# Patient Record
Sex: MALE | Employment: UNEMPLOYED | URBAN - METROPOLITAN AREA
[De-identification: names, ages, dates, MRNs, and addresses within clinical notes are randomized per-mention and may not be internally consistent; named-entity substitution may affect disease eponyms.]

---

## 2017-04-26 ENCOUNTER — OFFICE VISIT (OUTPATIENT)
Dept: FAMILY MEDICINE CLINIC | Age: 42
End: 2017-04-26

## 2017-04-26 VITALS
OXYGEN SATURATION: 98 % | HEART RATE: 74 BPM | HEIGHT: 66 IN | WEIGHT: 152 LBS | RESPIRATION RATE: 16 BRPM | SYSTOLIC BLOOD PRESSURE: 125 MMHG | DIASTOLIC BLOOD PRESSURE: 84 MMHG | BODY MASS INDEX: 24.43 KG/M2 | TEMPERATURE: 96.8 F
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
## 2017-04-26 RX ORDER — NAPROXEN 500 MG/1
TABLET ORAL
Qty: 180 TAB | Refills: 1 | Status: SHIPPED | OUTPATIENT
Start: 2017-04-26 | End: 2017-10-17

## 2017-04-26 RX ORDER — METHADONE HYDROCHLORIDE 10 MG/1
70 TABLET ORAL
COMMUNITY

## 2017-04-26 RX ORDER — NAPROXEN 500 MG/1
500 TABLET ORAL 2 TIMES DAILY WITH MEALS
Qty: 30 TAB | Refills: 1 | Status: SHIPPED | OUTPATIENT
Start: 2017-04-26 | End: 2017-04-26 | Stop reason: SDUPTHER

## 2017-04-26 NOTE — PROGRESS NOTES
Rm 1  Pt presents to the clinic complaining of right hand pain x 2 months. Pt believes it to be carpel tunnel syndrome. Depression Screening Completed: yes    Learning Assessment Completed: yes    Abuse Screening Completed: n/a    Health Maintenance reviewed and discussed per provider: yes     Coordination of Care:    1. Have you been to the ER, urgent care clinic since your last visit? Hospitalized since your last visit? no    2. Have you seen or consulted any other health care providers outside of the [address] [place] [address] since your last visit? Include any pap smears or colon screening.  no

## 2017-04-26 NOTE — PROGRESS NOTES
HISTORY OF PRESENT ILLNESS  Victor Manuel Ramos is a 38 y.o. male. HPI Comments: Pt presents with pain in the left hand and thumb that began when he started working as a . He also works part-time repairing cars. So, he has been working with his hands a lot. States he also gets numbness in the left arm/hand when he sleeps on it wrong. Denies any specific injury to the hand. He also nontes that he has [facility] really helped with his opiate addiction, but has some dehydration and constipation. Hand Pain    Associated symptoms include tingling (see HPI). Review of Systems   Respiratory: Negative for shortness of breath. Cardiovascular: Negative for chest pain. Gastrointestinal: Positive for constipation. Musculoskeletal: Positive for joint pain (right thumb) and myalgias (right hand). Neurological: Positive for tingling (see HPI). Negative for dizziness, focal weakness and seizures. Visit Vitals    /84 (BP 1 Location: Right arm, BP Patient Position: Sitting)    Pulse 74    Temp 96.8 °F (36 °C) (Oral)    Resp 16    Ht 5' 6\" (1.676 m)    Wt 152 lb (68.9 kg)    SpO2 98%    BMI 24.53 kg/m2       Physical Exam   Constitutional: He is oriented to person, place, and time. Vital signs are normal. He appears well-developed and well-nourished. He does not appear ill. No distress. HENT:   Head: Normocephalic and atraumatic. Right Ear: External ear normal.   Left Ear: External ear normal.   Nose: Nose normal. No nasal deformity. Mouth/Throat: Oropharynx is clear and moist and mucous membranes are normal.   Eyes: Conjunctivae are normal.   Neck: Neck supple. No tracheal tenderness present. Cardiovascular: Normal rate, regular rhythm and normal heart sounds. Exam reveals no gallop and no friction rub. No murmur heard. Pulses:       Radial pulses are 2+ on the right side, and 2+ on the left side.    Good capillary refill in the right fingers   Pulmonary/Chest: Effort normal and breath sounds normal. No respiratory distress. He has no decreased breath sounds. He has no wheezes. He has no rhonchi. He has no rales. Musculoskeletal:        Right elbow: Normal.He exhibits normal range of motion, no swelling and no deformity. Right hand: Normal. He exhibits normal range of motion, no tenderness, normal capillary refill, no deformity and no swelling. Normal sensation noted. Normal strength noted. He exhibits no finger abduction. Left hand: Normal.   Lymphadenopathy:     He has no cervical adenopathy. Neurological: He is alert and oriented to person, place, and time. He has normal strength. No sensory deficit. Phalen's and Tinel's are both negative.  strength is normal and symmetric   Skin: Skin is warm and dry. He is not diaphoretic. Psychiatric: He has a normal mood and affect. His speech is normal and behavior is normal. Thought content normal.   Nursing note and vitals reviewed. ASSESSMENT and PLAN  Exam is unimpressive with regard to carpal tunnel or other neuropathy. Likely overuse injury from long periods working with his hands with vibrating power tools. ICD-10-CM ICD-9-CM    1. Overuse injury T14.8 848.9 naproxen (NAPROSYN) 500 mg tablet     Attempting to treat inflammatory aspects of condition, but I strongly recommend reducing the use of the affected area as much as possible. We discussed the possibility of occupational therapy, but I'm not optimistic that it would help given the patients job duties. He indicates that the [employer] (automated) area of the shop might be available to him with some additional training, and I encourage him to pursue this. 20 minutes spent in patient counseling. Discussed the nature and likely causes of overuse injury. Also recommended icing the affected areas daily after work.      Provided additional information on inflammation, and counseled pt to avoid processed and sugary foods as a long-term strategy for protecting his liver and minimizing overall inflammation in the body. Pt verbalized understanding and agreement with the plan of care.     Vadim Arora PA-C

## 2017-04-26 NOTE — MR AVS SNAPSHOT
Visit Information Date & Time Provider Department Dept. Phone Encounter #  
 4/26/2017  7:00 AM Jorge Prince PA-C [employer] [phone] [id number] Upcoming Health Maintenance Date Due DTaP/Tdap/Td series (1 - Tdap) 2/13/1994 INFLUENZA AGE 9 TO ADULT 8/1/2016 Allergies as of 4/26/2017  Review Complete On: 4/26/2017 By: Jorge Prince PA-C No Known Allergies Current Immunizations  Never Reviewed No immunizations on file. Not reviewed this visit You Were Diagnosed With   
  
 Codes Comments Overuse injury    -  Primary ICD-10-CM: T14.8 ICD-9-CM: 017. 9 Vitals BP Pulse Temp Resp Height(growth percentile) Weight(growth percentile) 125/84 (BP 1 Location: Right arm, BP Patient Position: Sitting) 74 96.8 °F (36 °C) (Oral) 16 5' 6\" (1.676 m) 152 lb (68.9 kg) SpO2 BMI Smoking Status 98% 24.53 kg/m2 Never Smoker Vitals History BMI and BSA Data Body Mass Index Body Surface Area 24.53 kg/m 2 1.79 m 2 Preferred Pharmacy Pharmacy Name Phone [facility] [facility], [address] [place] [address] [phone] Your Updated Medication List  
  
   
This list is accurate as of: 4/26/17  8:01 AM.  Always use your most recent med list.  
  
  
  
  
 albuterol 90 mcg/actuation inhaler Commonly known as:  PROVENTIL HFA, VENTOLIN HFA, PROAIR HFA Take 2 Puffs by inhalation every six (6) hours as needed for Wheezing. ALBUTEROL IN Take  by inhalation. methadone 10 mg tablet Commonly known as:  DOLOPHINE Take 70 mg by mouth every four (4) hours as needed for Pain. naproxen 500 mg tablet Commonly known as:  NAPROSYN Take 1 Tab by mouth two (2) times daily (with meals). traZODone 50 mg tablet Commonly known as:  DESYREL  
TAKE 1 TABLET BY MOUTH EVERY NIGHT AT BEDTIME AS NEEDED FOR SLEEP  
  
  
  
  
 Prescriptions Sent to Pharmacy Refills  
 naproxen (NAPROSYN) 500 mg tablet 1 Sig: Take 1 Tab by mouth two (2) times daily (with meals). Class: Normal  
 Pharmacy: [facility] [address] [place] [address], [address] [place] [address] #: [phone] Route: Oral  
  
Patient Instructions Take the naprosyn twice daily for the next 1-2 weeks. Try to rest the right arm and alternate it with the left as much as possible. Consider icing it at home: 15-20 every 1-2 hours. Take it easy on sugars and foods with added sugar. If you need extra starch, focus on legumes, sweet potatoes, white potatoes, quinoa, etc.  
 
 
Inflammation is a natural part of the body's defenses and healing process. But excessive or chronic inflammation can lead to or worsen diseases such as heart disease, obesity, arthritis, and diabetes. Proper diet, exercise, sleep, and stress reduction are all important ways to keep inflammation in check. Here are some great online articles for more information: 
http://[employer]/grow/how-inflammation-affects-health-body 
http://Always Prepped/13-anti-inflammatory-foods/ 
http://[employer]/fitness/exercise-inflammation-060513 
 
http://Promoboxx/blog/2012/01/27/inflammation-how-to-cool-the-fire-inside-you-thats-making-you-fat-and-diseased/ 
 
Here are some things that might help your constipation:  
Dr Tony Escobar has a good blog on simple ways to treat constipation: 
http://HTP.Nextinit/blog/2017/01/13/simple-steps-dealing-constipation/ 
 
Check out this article on the relationship of fiber to constipation: 
https://Always Prepped/fiber-and-constipation-truth/ 
 
Probiotics may also help:  
https://Always Prepped/best-probiotic-supplement/ 
 
 
 
 
  
Hand Pain: Care Instructions Your Care Instructions Common causes of hand pain are overuse and injuries, such as might happen during sports or home repair projects. Everyday wear and tear, especially as you get older, also can cause hand pain. Most minor hand injuries will heal on their own, and home treatment is usually all you need to do. If you have sudden and severe pain, you may need tests and treatment. Follow-up care is a key part of your treatment and safety. Be sure to make and go to all appointments, and call your doctor if you are having problems. Its also a good idea to know your test results and keep a list of the medicines you take. How can you care for yourself at home? · Take pain medicines exactly as directed. ¨ If the doctor gave you a prescription medicine for pain, take it as prescribed. ¨ If you are not taking a prescription pain medicine, ask your doctor if you can take an over-the-counter medicine. · Rest and protect your hand. Take a break from any activity that may cause pain. · Put ice or a cold pack on your hand for 10 to 20 minutes at a time. Put a thin cloth between the ice and your skin. · Prop up the sore hand on a pillow when you ice it or anytime you sit or lie down during the next 3 days. Try to keep it above the level of your heart. This will help reduce swelling. · If your doctor recommends a sling, splint, or elastic bandage to support your hand, wear it as directed. When should you call for help? Call 911 anytime you think you may need emergency care. For example, call if: 
· Your hand turns cool or pale or changes color. Call your doctor now or seek immediate medical care if: 
· You cannot move your hand. · Your hand pops, moves out of its normal position, and then returns to its normal position. · You have signs of infection, such as: 
¨ Increased pain, swelling, warmth, or redness. ¨ Red streaks leading from the sore area. ¨ Pus draining from a place on your hand. ¨ A fever. · Your hand feels numb or tingly.  
Watch closely for changes in your health, and be sure to contact your doctor if: 
· Your hand feels unstable when you try to use it. · You do not get better as expected. · You have any new symptoms, such as swelling. · Bruises from an injury to your hand last longer than 2 weeks. Where can you learn more? Go to http://dana-jesse.info/. Enter R273 in the search box to learn more about \"Hand Pain: Care Instructions. \" Current as of: May 27, 2016 Content Version: 11.2 © 6655-6603 [employer]. Care instructions adapted under license by [employer] (which disclaims liability or warranty for this information). If you have questions about a medical condition or this instruction, always ask your healthcare professional. [address] any warranty or liability for your use of this information. Introducing [facility]! Minerva Ruiz introduces SoSocio patient portal. Now you can access parts of your medical record, email your doctor's office, and request medication refills online. 1. In your internet browser, go to [email or web address] 2. Click on the First Time User? Click Here link in the Sign In box. You will see the New Member Sign Up page. 3. Enter your SoSocio Access Code exactly as it appears below. You will not need to use this code after youve completed the sign-up process. If you do not sign up before the expiration date, you must request a new code. · SoSocio Access Code: [email or web address] Expires: 7/25/2017  8:01 AM 
 
4. Enter the last four digits of your Social Security Number (xxxx) and Date of Birth (mm/dd/yyyy) as indicated and click Submit. You will be taken to the next sign-up page. 5. Create a SoSocio ID. This will be your SoSocio login ID and cannot be changed, so think of one that is secure and easy to remember. 6. Create a SoSocio password. You can change your password at any time. 7. Enter your Password Reset Question and Answer.  This can be used at a later time if you forget your password. 8. Enter your e-mail address. You will receive e-mail notification when new information is available in [address] 9. Click Sign Up. You can now view and download portions of your medical record. 10. Click the Download Summary menu link to download a portable copy of your medical information. If you have questions, please visit the Frequently Asked Questions section of the PatientKeeper website. Remember, PatientKeeper is NOT to be used for urgent needs. For medical emergencies, dial 911. Now available from your iPhone and Android! Please provide this summary of care documentation to your next provider. Your primary care clinician is listed as Steven Acosta. If you have any questions after today's visit, please call [phone].

## 2017-04-26 NOTE — PATIENT INSTRUCTIONS
Take the naprosyn twice daily for the next 1-2 weeks. Try to rest the right arm and alternate it with the left as much as possible. Consider icing it at home: 15-20 every 1-2 hours. Take it easy on sugars and foods with added sugar. If you need extra starch, focus on legumes, sweet potatoes, white potatoes, quinoa, etc.       Inflammation is a natural part of the body's defenses and healing process. But excessive or chronic inflammation can lead to or worsen diseases such as heart disease, obesity, arthritis, and diabetes. Proper diet, exercise, sleep, and stress reduction are all important ways to keep inflammation in check. Here are some great online articles for more information:  http://[employer]/grow/how-inflammation-affects-health-body  http://Cloudwear/13-anti-inflammatory-foods/  http://[employer]orix/fitness/exercise-inflammation-060513    http://GroupSpaces/blog/2012/01/27/inflammation-how-to-cool-the-fire-inside-you-thats-making-you-fat-and-diseased/    Here are some things that might help your constipation:   Dr Torri Antunez has a good blog on simple ways to treat constipation:  http://GroupSpaces/blog/2017/01/13/simple-steps-dealing-constipation/    Check out this article on the relationship of fiber to constipation:  https://Cloudwear/fiber-and-constipation-truth/    Probiotics may also help:   https://Cloudwear/best-probiotic-supplement/             Hand Pain: Care Instructions  Your Care Instructions  Common causes of hand pain are overuse and injuries, such as might happen during sports or home repair projects. Everyday wear and tear, especially as you get older, also can cause hand pain. Most minor hand injuries will heal on their own, and home treatment is usually all you need to do. If you have sudden and severe pain, you may need tests and treatment. Follow-up care is a key part of your treatment and safety.  Be sure to make and go to all appointments, and call your doctor if you are having problems. Its also a good idea to know your test results and keep a list of the medicines you take. How can you care for yourself at home? · Take pain medicines exactly as directed. ¨ If the doctor gave you a prescription medicine for pain, take it as prescribed. ¨ If you are not taking a prescription pain medicine, ask your doctor if you can take an over-the-counter medicine. · Rest and protect your hand. Take a break from any activity that may cause pain. · Put ice or a cold pack on your hand for 10 to 20 minutes at a time. Put a thin cloth between the ice and your skin. · Prop up the sore hand on a pillow when you ice it or anytime you sit or lie down during the next 3 days. Try to keep it above the level of your heart. This will help reduce swelling. · If your doctor recommends a sling, splint, or elastic bandage to support your hand, wear it as directed. When should you call for help? Call 911 anytime you think you may need emergency care. For example, call if:  · Your hand turns cool or pale or changes color. Call your doctor now or seek immediate medical care if:  · You cannot move your hand. · Your hand pops, moves out of its normal position, and then returns to its normal position. · You have signs of infection, such as:  ¨ Increased pain, swelling, warmth, or redness. ¨ Red streaks leading from the sore area. ¨ Pus draining from a place on your hand. ¨ A fever. · Your hand feels numb or tingly. Watch closely for changes in your health, and be sure to contact your doctor if:  · Your hand feels unstable when you try to use it. · You do not get better as expected. · You have any new symptoms, such as swelling. · Bruises from an injury to your hand last longer than 2 weeks. Where can you learn more? Go to http://dana-jesse.info/.   Enter R273 in the search box to learn more about \"Hand Pain: Care Instructions. \"  Current as of: May 27, 2016  Content Version: 11.2  © 8500-2915 [employer], [facility]. Care instructions adapted under license by [employer] (which disclaims liability or warranty for this information). If you have questions about a medical condition or this instruction, always ask your healthcare professional. [place] [address] any warranty or liability for your use of this information.

## 2017-06-26 ENCOUNTER — OFFICE VISIT (OUTPATIENT)
Dept: FAMILY MEDICINE CLINIC | Age: 42
End: 2017-06-26

## 2017-06-26 VITALS
TEMPERATURE: 97.2 F | HEART RATE: 72 BPM | OXYGEN SATURATION: 95 % | SYSTOLIC BLOOD PRESSURE: 110 MMHG | BODY MASS INDEX: 26.2 KG/M2 | WEIGHT: 163 LBS | RESPIRATION RATE: 16 BRPM | HEIGHT: 66 IN | DIASTOLIC BLOOD PRESSURE: 69 MMHG

## 2017-06-26 DIAGNOSIS — L08.9 INFECTION OF SKIN: ICD-10-CM

## 2017-06-26 DIAGNOSIS — L23.3 ALLERGIC CONTACT DERMATITIS DUE TO DRUGS IN CONTACT WITH SKIN: Primary | ICD-10-CM

## 2017-06-26 RX ORDER — FLUOCINONIDE 0.5 MG/G
OINTMENT TOPICAL 2 TIMES DAILY
Qty: 45 G | Refills: 0 | Status: SHIPPED | OUTPATIENT
Start: 2017-06-26 | End: 2017-10-17

## 2017-06-26 RX ORDER — CEPHALEXIN 500 MG/1
500 CAPSULE ORAL 2 TIMES DAILY
Qty: 10 CAP | Refills: 0 | Status: SHIPPED | OUTPATIENT
Start: 2017-06-26 | End: 2017-07-01

## 2017-06-26 RX ORDER — PREDNISONE 50 MG/1
50 TABLET ORAL
Qty: 7 TAB | Refills: 0 | Status: SHIPPED | OUTPATIENT
Start: 2017-06-26 | End: 2017-10-17

## 2017-06-26 NOTE — PATIENT INSTRUCTIONS
Take the prednisone tablet with dinner tonight, then every morning for 6 more days. Take the antibiotic twice daily for 5 days. Apply the antibiotic ointment to the rash twice daily for a few days, the you should be able to stop it. Recheck as needed.

## 2017-06-26 NOTE — PROGRESS NOTES
HISTORY OF PRESENT ILLNESS  Susannah Hobson is a 38 y.o. male. HPI Comments: Shaw Avelar put on deodorant about a week ago and starting breaking out in his axillae the next day. He then recalled that he had that same reaction with that product in the past. So, he's been hurting all week, and he's tried Neosporin for the rash but it didn't help. No contraindication to prednisone. Rash    Associated symptoms include itching. Review of Systems   Constitutional: Negative for chills and fever. Eyes: Negative for blurred vision and double vision. Respiratory: Negative for cough and shortness of breath. Cardiovascular: Negative for chest pain and palpitations. Gastrointestinal: Negative for abdominal pain, nausea and vomiting. Skin: Positive for itching and rash. Neurological: Negative for headaches. Physical Exam   Constitutional: He appears well-developed and well-nourished. HENT:   Right Ear: Tympanic membrane, external ear and ear canal normal.   Left Ear: Tympanic membrane, external ear and ear canal normal.   Nose: Nose normal.   Mouth/Throat: Uvula is midline, oropharynx is clear and moist and mucous membranes are normal. No posterior oropharyngeal erythema. Eyes: Conjunctivae are normal. Pupils are equal, round, and reactive to light. Right conjunctiva is not injected. Left conjunctiva is not injected. Neck: Neck supple. No thyromegaly present. Cardiovascular: Normal rate and regular rhythm. Pulmonary/Chest: Effort normal and breath sounds normal. No respiratory distress. He has no wheezes. He has no rales. Lymphadenopathy:     He has no cervical adenopathy. Skin: Rash noted. Red, raw lesions in both axillae, some gold crusting in the R one. Nursing note and vitals reviewed. ASSESSMENT and PLAN    ICD-10-CM ICD-9-CM    1. Allergic contact dermatitis due to drugs in contact with skin L23.3 692. 3 predniSONE (DELTASONE) 50 mg tablet      fluocinoNIDE (LIDEX) 0.05 % ointment   2.  Infection of skin L08.9 686.9 cephALEXin (KEFLEX) 500 mg capsule       AVS instructions reviewed with patient, pt verbalized understanding

## 2017-06-26 NOTE — MR AVS SNAPSHOT
Visit Information Date & Time Provider Department Dept. Phone Encounter #  
 6/26/2017  3:15 PM Scott Panda, [address] [phone] [id number] Follow-up Instructions Return if symptoms worsen or fail to improve. Upcoming Health Maintenance Date Due DTaP/Tdap/Td series (1 - Tdap) 2/13/1994 INFLUENZA AGE 9 TO ADULT 8/1/2017 Allergies as of 6/26/2017  Review Complete On: 6/26/2017 By: Scott Panda MD  
 No Known Allergies Current Immunizations  Never Reviewed No immunizations on file. Not reviewed this visit You Were Diagnosed With   
  
 Codes Comments Allergic contact dermatitis due to drugs in contact with skin    -  Primary ICD-10-CM: L23.3 ICD-9-CM: 692.3 Infection of skin     ICD-10-CM: L08.9 ICD-9-CM: 000. 9 Vitals BP Pulse Temp Resp Height(growth percentile) Weight(growth percentile) 110/69 (BP 1 Location: Right arm, BP Patient Position: Sitting) 72 97.2 °F (36.2 °C) (Oral) 16 5' 6\" (1.676 m) 163 lb (73.9 kg) SpO2 BMI Smoking Status 95% 26.31 kg/m2 Never Smoker Vitals History BMI and BSA Data Body Mass Index Body Surface Area  
 26.31 kg/m 2 1.86 m 2 Preferred Pharmacy Pharmacy Name Phone [facility] [address] [place] [address] [phone] Your Updated Medication List  
  
   
This list is accurate as of: 6/26/17  3:38 PM.  Always use your most recent med list.  
  
  
  
  
 albuterol 90 mcg/actuation inhaler Commonly known as:  PROVENTIL HFA, VENTOLIN HFA, PROAIR HFA Take 2 Puffs by inhalation every six (6) hours as needed for Wheezing. ALBUTEROL IN Take  by inhalation. cephALEXin 500 mg capsule Commonly known as:  Alley Pluck Take 1 Cap by mouth two (2) times a day for 5 days. fluocinoNIDE 0.05 % ointment Commonly known as:  LIDEX Apply  to affected area two (2) times a day. methadone 10 mg tablet Commonly known as:  DOLOPHINE Take 70 mg by mouth every four (4) hours as needed for Pain. naproxen 500 mg tablet Commonly known as:  NAPROSYN  
TAKE 1 TABLET BY MOUTH TWICE DAILY WITH MEALS  
  
 predniSONE 50 mg tablet Commonly known as:  Mak Cinthia Take 1 Tab by mouth every morning. With food  
  
 traZODone 50 mg tablet Commonly known as:  DESYREL  
TAKE 1 TABLET BY MOUTH EVERY NIGHT AT BEDTIME AS NEEDED FOR SLEEP Prescriptions Sent to Pharmacy Refills  
 predniSONE (DELTASONE) 50 mg tablet 0 Sig: Take 1 Tab by mouth every morning. With food Class: Normal  
 Pharmacy: [facility] [address] [place] [address] Ph #: [phone] Route: Oral  
 fluocinoNIDE (LIDEX) 0.05 % ointment 0 Sig: Apply  to affected area two (2) times a day. Class: Normal  
 Pharmacy: [facility] [address] [place] [address] Ph #: [phone] Route: Topical  
 cephALEXin (KEFLEX) 500 mg capsule 0 Sig: Take 1 Cap by mouth two (2) times a day for 5 days. Class: Normal  
 Pharmacy: [facility] [address] [place] [address] Ph #: [phone] Route: Oral  
  
Follow-up Instructions Return if symptoms worsen or fail to improve. Patient Instructions Take the prednisone tablet with dinner tonight, then every morning for 6 more days. Take the antibiotic twice daily for 5 days. Apply the antibiotic ointment to the rash twice daily for a few days, the you should be able to stop it. Recheck as needed. Introducing [facility] & HEALTH SERVICES! Seema Tenorio introduces [employer] patient portal. Now you can access parts of your medical record, email your doctor's office, and request medication refills online. 1. In your internet browser, go to [email or web address] 2. Click on the First Time User? Click Here link in the Sign In box. You will see the New Member Sign Up page. 3. Enter your [employer] Access Code exactly as it appears below. You will not need to use this code after youve completed the sign-up process. If you do not sign up before the expiration date, you must request a new code. · [employer] Access Code: [email or web address] Expires: 7/25/2017  8:01 AM 
 
4. Enter the last four digits of your Social Security Number (xxxx) and Date of Birth (mm/dd/yyyy) as indicated and click Submit. You will be taken to the next sign-up page. 5. Create a [employer] ID. This will be your [employer] login ID and cannot be changed, so think of one that is secure and easy to remember. 6. Create a [employer] password. You can change your password at any time. 7. Enter your Password Reset Question and Answer. This can be used at a later time if you forget your password. 8. Enter your e-mail address. You will receive e-mail notification when new information is available in [address] 9. Click Sign Up. You can now view and download portions of your medical record. 10. Click the Download Summary menu link to download a portable copy of your medical information. If you have questions, please visit the Frequently Asked Questions section of the [employer] website. Remember, [employer] is NOT to be used for urgent needs. For medical emergencies, dial 911. Now available from your iPhone and Android! Please provide this summary of care documentation to your next provider. Your primary care clinician is listed as Steven Acosta. If you have any questions after today's visit, please call [phone].

## 2017-06-26 NOTE — PROGRESS NOTES
Rm 1  Pt presents to the clinic complaining of a rash under both arms. Pt states he used a new Deoderant and it started to burn after applying. Depression Screening Completed: yes    Learning Assessment Completed: yes    Abuse Screening Completed: n/a    Health Maintenance reviewed and discussed per provider: yes     Coordination of Care:    1. Have you been to the ER, urgent care clinic since your last visit? Hospitalized since your last visit? no    2. Have you seen or consulted any other health care providers outside of the [facility] since your last visit? Include any pap smears or colon screening.  no

## 2017-07-24 RX ORDER — ALBUTEROL SULFATE 90 UG/1
AEROSOL, METERED RESPIRATORY (INHALATION)
Qty: 1 INHALER | Refills: 0 | Status: SHIPPED | OUTPATIENT
Start: 2017-07-24 | End: 2017-09-18 | Stop reason: SDUPTHER

## 2017-09-18 ENCOUNTER — OFFICE VISIT (OUTPATIENT)
Dept: FAMILY MEDICINE CLINIC | Age: 42
End: 2017-09-18

## 2017-09-18 VITALS
BODY MASS INDEX: 25.23 KG/M2 | HEART RATE: 61 BPM | OXYGEN SATURATION: 90 % | TEMPERATURE: 98.4 F | DIASTOLIC BLOOD PRESSURE: 75 MMHG | SYSTOLIC BLOOD PRESSURE: 110 MMHG | HEIGHT: 66 IN | WEIGHT: 157 LBS | RESPIRATION RATE: 16 BRPM

## 2017-09-18 DIAGNOSIS — Z13.31 SCREENING FOR DEPRESSION: ICD-10-CM

## 2017-09-18 DIAGNOSIS — M79.644 THUMB PAIN, RIGHT: ICD-10-CM

## 2017-09-18 DIAGNOSIS — J45.20 MILD INTERMITTENT ASTHMA WITHOUT COMPLICATION: Primary | ICD-10-CM

## 2017-09-18 RX ORDER — ALBUTEROL SULFATE 90 UG/1
AEROSOL, METERED RESPIRATORY (INHALATION)
Qty: 1 INHALER | Refills: 2 | Status: SHIPPED | OUTPATIENT
Start: 2017-09-18 | End: 2017-10-17

## 2017-09-18 NOTE — PROGRESS NOTES
HISTORY OF PRESENT ILLNESS  Yana Crowell is a 38 y.o. male. HPI Comments: Kieran Godwin is here for refill of his Albuterol. He states he is using a few times a week at night and does not use during the day. He states it works well and he has been on almost his life and is requesting a couple of inhalers. His lungs are usually worse this time of year. He also would like a referral to a hand/ortho doctor for his R thumb. He uses his hands for work and has little kids who he is active with. He states it is becoming worse and hard to do things now because of the pain. He states the medication he was prescribed previously helped a little but he does not want to take medications he wants it fixed. Medication Refill   Pertinent negatives include no chest pain, no abdominal pain, no headaches and no shortness of breath. Review of Systems   Constitutional: Negative for chills and fever. Eyes: Negative for blurred vision and double vision. Respiratory: Positive for wheezing. Negative for cough and shortness of breath. Cardiovascular: Negative for chest pain and palpitations. Gastrointestinal: Negative for abdominal pain, nausea and vomiting. Musculoskeletal: Positive for joint pain. R thumb   Neurological: Negative for headaches. Physical Exam   Constitutional: He is oriented to person, place, and time. He appears well-developed and well-nourished. Eyes: Pupils are equal, round, and reactive to light. Neck: Neck supple. Cardiovascular: Normal rate, regular rhythm and normal heart sounds. Pulmonary/Chest: Effort normal and breath sounds normal. No respiratory distress. He has no wheezes. He has no rales. Abdominal: Soft. There is no tenderness. Musculoskeletal:   R thumb non-tender, full ROM. Perhaps slightly swollen at IP joint. Lymphadenopathy:     He has no cervical adenopathy. Neurological: He is alert and oriented to person, place, and time.    Nursing note and vitals reviewed. ASSESSMENT and PLAN    ICD-10-CM ICD-9-CM    1. Mild intermittent asthma without complication G79.98 781.52 albuterol (VENTOLIN HFA) 90 mcg/actuation inhaler   2.  Thumb pain, right M79.644 729.5 REFERRAL TO HAND SURGERY   3. Screening for depression Z13.89 V79.0 BEHAV ASSMT W/SCORE & DOCD/STAND INSTRUMENT       AVS instructions reviewed with patient, pt verbalized understanding

## 2017-09-18 NOTE — PROGRESS NOTES
Patient presents to the clinic for a medication refill.     Requested Prescriptions     Pending Prescriptions Disp Refills    albuterol (VENTOLIN HFA) 90 mcg/actuation inhaler 1 Inhaler 0

## 2017-10-17 ENCOUNTER — OFFICE VISIT (OUTPATIENT)
Dept: FAMILY MEDICINE CLINIC | Age: 42
End: 2017-10-17

## 2017-10-17 VITALS
DIASTOLIC BLOOD PRESSURE: 66 MMHG | SYSTOLIC BLOOD PRESSURE: 106 MMHG | HEIGHT: 66 IN | OXYGEN SATURATION: 94 % | BODY MASS INDEX: 24.91 KG/M2 | RESPIRATION RATE: 14 BRPM | TEMPERATURE: 97.9 F | WEIGHT: 155 LBS | HEART RATE: 61 BPM

## 2017-10-17 DIAGNOSIS — H92.01 RIGHT EAR PAIN: Primary | ICD-10-CM

## 2017-10-17 DIAGNOSIS — H69.81 DYSFUNCTION OF RIGHT EUSTACHIAN TUBE: ICD-10-CM

## 2017-10-17 NOTE — MR AVS SNAPSHOT
Visit Information Date & Time Provider Department Dept. Phone Encounter #  
 10/17/2017  1:30 PM Maryann Shelton, [address] [facility] [phone] [id number] Follow-up Instructions Return if symptoms worsen or fail to improve. Upcoming Health Maintenance Date Due Pneumococcal 19-64 Medium Risk (1 of 1 - PPSV23) 2/13/1992 DTaP/Tdap/Td series (1 - Tdap) 2/13/1994 INFLUENZA AGE 9 TO ADULT 8/1/2017 Allergies as of 10/17/2017  Review Complete On: 10/17/2017 By: Maryann Shelton MD  
 No Known Allergies Current Immunizations  Never Reviewed No immunizations on file. Not reviewed this visit You Were Diagnosed With   
  
 Codes Comments Right ear pain    -  Primary ICD-10-CM: H92.01 
ICD-9-CM: 388.70 Dysfunction of right eustachian tube     ICD-10-CM: H69.81 ICD-9-CM: 381.81 Vitals BP Pulse Temp Resp Height(growth percentile) Weight(growth percentile) 106/66 (BP 1 Location: Right arm, BP Patient Position: Sitting) 61 97.9 °F (36.6 °C) (Oral) 14 5' 6\" (1.676 m) 155 lb (70.3 kg) SpO2 BMI Smoking Status 94% 25.02 kg/m2 Never Smoker BMI and BSA Data Body Mass Index Body Surface Area 25.02 kg/m 2 1.81 m 2 Preferred Pharmacy Pharmacy Name Phone [facility] [address] [facility], [address] [place] [address] [phone] Your Updated Medication List  
  
   
This list is accurate as of: 10/17/17  2:07 PM.  Always use your most recent med list.  
  
  
  
  
 methadone 10 mg tablet Commonly known as:  DOLOPHINE Take 70 mg by mouth every four (4) hours as needed for Pain. Follow-up Instructions Return if symptoms worsen or fail to improve. Patient Instructions Notify me by Thursday if it's not better. Introducing [facility] & HEALTH SERVICES!    
 Leticia Hurtado introduces [employer] patient portal. Now you can access parts of your medical record, email your doctor's office, and request medication refills online. 1. In your internet browser, go to [email or web address] 2. Click on the First Time User? Click Here link in the Sign In box. You will see the New Member Sign Up page. 3. Enter your [employer] Access Code exactly as it appears below. You will not need to use this code after youve completed the sign-up process. If you do not sign up before the expiration date, you must request a new code. · [employer] Access Code: [email or web address] Expires: 12/17/2017  3:53 PM 
 
4. Enter the last four digits of your Social Security Number (xxxx) and Date of Birth (mm/dd/yyyy) as indicated and click Submit. You will be taken to the next sign-up page. 5. Create a [employer] ID. This will be your [employer] login ID and cannot be changed, so think of one that is secure and easy to remember. 6. Create a [employer] password. You can change your password at any time. 7. Enter your Password Reset Question and Answer. This can be used at a later time if you forget your password. 8. Enter your e-mail address. You will receive e-mail notification when new information is available in [address] 9. Click Sign Up. You can now view and download portions of your medical record. 10. Click the Download Summary menu link to download a portable copy of your medical information. If you have questions, please visit the Frequently Asked Questions section of the [employer] website. Remember, [employer] is NOT to be used for urgent needs. For medical emergencies, dial 911. Now available from your iPhone and Android! Please provide this summary of care documentation to your next provider. Your primary care clinician is listed as Steven Acosta. If you have any questions after today's visit, please call [phone].

## 2017-10-17 NOTE — PROGRESS NOTES
HISTORY OF PRESENT ILLNESS  Chanel Devine is a 38 y.o. male. HPI Comments: Cheyenne Robison is here because of R ear pain for the last day or two. No cold sx, no recent flying or swimming. No history of TMJ problems. No sore throat    Ear Pain   Pertinent negatives include no chest pain, no abdominal pain, no headaches and no shortness of breath. Review of Systems   Constitutional: Negative for chills and fever. HENT: Positive for ear pain. Negative for congestion, ear discharge, hearing loss, nosebleeds and tinnitus. Eyes: Negative for blurred vision and double vision. Respiratory: Negative for cough and shortness of breath. Cardiovascular: Negative for chest pain and palpitations. Gastrointestinal: Negative for abdominal pain, nausea and vomiting. Neurological: Negative for headaches. Physical Exam   Constitutional: He appears well-developed and well-nourished. HENT:   Right Ear: Tympanic membrane, external ear and ear canal normal.   Left Ear: Tympanic membrane, external ear and ear canal normal.   Nose: Nose normal.   Mouth/Throat: Uvula is midline, oropharynx is clear and moist and mucous membranes are normal. No posterior oropharyngeal erythema. TMs look completely normal. TMJ non-tender   Eyes: Conjunctivae are normal. Pupils are equal, round, and reactive to light. Right conjunctiva is not injected. Left conjunctiva is not injected. Neck: Neck supple. No thyromegaly present. Cardiovascular: Normal rate and regular rhythm. Pulmonary/Chest: Effort normal and breath sounds normal. No respiratory distress. He has no wheezes. He has no rales. Abdominal: He exhibits no distension. Lymphadenopathy:     He has no cervical adenopathy. Skin: No rash noted. Nursing note and vitals reviewed. ASSESSMENT and PLAN    ICD-10-CM ICD-9-CM    1. Right ear pain H92.01 388.70    2.  Dysfunction of right eustachian tube H69.81 381.81        AVS instructions reviewed with patient, pt verbalized understanding

## 2017-12-04 ENCOUNTER — OFFICE VISIT (OUTPATIENT)
Dept: FAMILY MEDICINE CLINIC | Age: 42
End: 2017-12-04

## 2017-12-04 VITALS
RESPIRATION RATE: 14 BRPM | BODY MASS INDEX: 25.39 KG/M2 | SYSTOLIC BLOOD PRESSURE: 115 MMHG | HEIGHT: 66 IN | TEMPERATURE: 96.9 F | HEART RATE: 72 BPM | DIASTOLIC BLOOD PRESSURE: 76 MMHG | WEIGHT: 158 LBS | OXYGEN SATURATION: 98 %

## 2017-12-04 DIAGNOSIS — J45.20 MILD INTERMITTENT ASTHMA WITHOUT COMPLICATION: ICD-10-CM

## 2017-12-04 DIAGNOSIS — J02.9 SORE THROAT: ICD-10-CM

## 2017-12-04 DIAGNOSIS — J06.9 VIRAL URI: Primary | ICD-10-CM

## 2017-12-04 LAB
S PYO AG THROAT QL: NEGATIVE
VALID INTERNAL CONTROL?: YES

## 2017-12-04 RX ORDER — ALBUTEROL SULFATE 90 UG/1
2 AEROSOL, METERED RESPIRATORY (INHALATION)
Qty: 1 INHALER | Refills: 3 | Status: SHIPPED | OUTPATIENT
Start: 2017-12-04

## 2017-12-04 NOTE — PATIENT INSTRUCTIONS
Symptomatic treatment for your throat (lozenges, tylenol, advil, etc). I have refilled your inhaler, use as needed. Recheck as needed.

## 2017-12-04 NOTE — MR AVS SNAPSHOT
Visit Information Date & Time Provider Department Dept. Phone Encounter #  
 12/4/2017  1:45 PM Jason Patel [facility] [address] [phone] [id number] Follow-up Instructions Return if symptoms worsen or fail to improve. Upcoming Health Maintenance Date Due Pneumococcal 19-64 Medium Risk (1 of 1 - PPSV23) 2/13/1992 DTaP/Tdap/Td series (1 - Tdap) 2/13/1994 Influenza Age 5 to Adult 8/1/2017 Allergies as of 12/4/2017  Review Complete On: 12/4/2017 By: Vikas Vizcarra LPN No Known Allergies Current Immunizations  Never Reviewed No immunizations on file. Not reviewed this visit You Were Diagnosed With   
  
 Codes Comments Viral URI    -  Primary ICD-10-CM: J06.9, B97.89 ICD-9-CM: 465.9 Sore throat     ICD-10-CM: J02.9 ICD-9-CM: 559 Mild intermittent asthma without complication     FPQ-43-LW: J45.20 ICD-9-CM: 493.90 Vitals BP Pulse Temp Resp Height(growth percentile) Weight(growth percentile) 115/76 (BP 1 Location: Right arm, BP Patient Position: Sitting) 72 96.9 °F (36.1 °C) (Oral) 14 5' 6\" (1.676 m) 158 lb (71.7 kg) SpO2 BMI Smoking Status 98% 25.5 kg/m2 Never Smoker BMI and BSA Data Body Mass Index Body Surface Area 25.5 kg/m 2 1.83 m 2 Preferred Pharmacy Pharmacy Name Phone [facility] [address] [phone] Your Updated Medication List  
  
   
This list is accurate as of: 12/4/17  2:40 PM.  Always use your most recent med list.  
  
  
  
  
 albuterol 90 mcg/actuation inhaler Commonly known as:  PROVENTIL HFA, VENTOLIN HFA, PROAIR HFA Take 2 Puffs by inhalation every six (6) hours as needed for Wheezing. methadone 10 mg tablet Commonly known as:  DOLOPHINE Take 70 mg by mouth every four (4) hours as needed for Pain. Prescriptions Sent to Pharmacy Refills  
 albuterol (PROVENTIL HFA, VENTOLIN HFA, PROAIR HFA) 90 mcg/actuation inhaler 3 Sig: Take 2 Puffs by inhalation every six (6) hours as needed for Wheezing. Class: Normal  
 Pharmacy: [facility] [address] [place] [address], [address] [place] [address] #: [phone] Route: Inhalation We Performed the Following AMB POC RAPID STREP A [37342 CPT(R)] Follow-up Instructions Return if symptoms worsen or fail to improve. Patient Instructions Symptomatic treatment for your throat (lozenges, tylenol, advil, etc). I have refilled your inhaler, use as needed. Recheck as needed. Introducing [facility] & HEALTH SERVICES! Kelly Hess introduces [employer] patient portal. Now you can access parts of your medical record, email your doctor's office, and request medication refills online. 1. In your internet browser, go to [email or web address] 2. Click on the First Time User? Click Here link in the Sign In box. You will see the New Member Sign Up page. 3. Enter your [employer] Access Code exactly as it appears below. You will not need to use this code after youve completed the sign-up process. If you do not sign up before the expiration date, you must request a new code. · [employer] Access Code: [email or web address] Expires: 12/17/2017  2:53 PM 
 
4. Enter the last four digits of your Social Security Number (xxxx) and Date of Birth (mm/dd/yyyy) as indicated and click Submit. You will be taken to the next sign-up page. 5. Create a [employer] ID. This will be your UserApp login ID and cannot be changed, so think of one that is secure and easy to remember. 6. Create a UserApp password. You can change your password at any time. 7. Enter your Password Reset Question and Answer. This can be used at a later time if you forget your password. 8. Enter your e-mail address. You will receive e-mail notification when new information is available in [address] 9. Click Sign Up. You can now view and download portions of your medical record. 10. Click the Download Summary menu link to download a portable copy of your medical information. If you have questions, please visit the Frequently Asked Questions section of the [employer] website. Remember, [employer] is NOT to be used for urgent needs. For medical emergencies, dial 911. Now available from your iPhone and Android! Please provide this summary of care documentation to your next provider. Your primary care clinician is listed as Steven Acosta. If you have any questions after today's visit, please call [phone].

## 2017-12-04 NOTE — PROGRESS NOTES
Patient presents to the clinic for a sore throat that began yesterday. Flu Shot Requested: no    Depression Screening:  PHQ over the last two weeks 12/4/2017 10/17/2017 9/18/2017 6/26/2017 4/26/2017 10/11/2016 8/10/2015   Little interest or pleasure in doing things Not at all Not at all Not at all Not at all Not at all Not at all Several days   Feeling down, depressed or hopeless Not at all Not at all Not at all Not at all Not at all Not at all More than half the days   Total Score PHQ 2 0 0 0 0 0 0 3       Learning Assessment:  Learning Assessment 8/10/2015   PRIMARY LEARNER Patient   HIGHEST LEVEL OF EDUCATION - PRIMARY LEARNER  SOME COLLEGE   BARRIERS PRIMARY LEARNER NONE   CO-LEARNER CAREGIVER No   PRIMARY LANGUAGE ENGLISH   LEARNER PREFERENCE PRIMARY DEMONSTRATION   ANSWERED BY Patient   RELATIONSHIP SELF       Abuse Screening:  No flowsheet data found. Health Maintenance reviewed and discussed per provider: yes     Coordination of Care:    1. Have you been to the ER, urgent care clinic since your last visit? Hospitalized since your last visit? yes    2. Have you seen or consulted any other health care providers outside of the [address] [place] [address] since your last visit? Include any pap smears or colon screening.  no

## 2017-12-04 NOTE — PROGRESS NOTES
HISTORY OF PRESENT ILLNESS  Sergey Rivero is a 38 y.o. male. HPI Comments: Mare Headley is here because of a scratchy throat for 1-2 days, \"feels like the start of something\". No fever, chills, nausea or vomiting. He also needs a refill of his albuterol inhaler. This time of year he uses it occasionally. Sore Throat    Associated symptoms include congestion. Pertinent negatives include no vomiting, no headaches, no shortness of breath and no cough. Review of Systems   Constitutional: Negative for chills and fever. HENT: Positive for congestion and sore throat. Eyes: Negative for blurred vision and double vision. Respiratory: Negative for cough and shortness of breath. Cardiovascular: Negative for chest pain and palpitations. Gastrointestinal: Negative for abdominal pain, nausea and vomiting. Genitourinary: Negative for dysuria. Neurological: Negative for headaches. Physical Exam   Constitutional: He appears well-developed and well-nourished. HENT:   Right Ear: Tympanic membrane, external ear and ear canal normal.   Left Ear: Tympanic membrane, external ear and ear canal normal.   Nose: Nose normal.   Mouth/Throat: Uvula is midline, oropharynx is clear and moist and mucous membranes are normal. No posterior oropharyngeal erythema. Throat not red, no exudate. Eyes: Conjunctivae are normal. Pupils are equal, round, and reactive to light. Right conjunctiva is not injected. Left conjunctiva is not injected. Neck: Neck supple. No thyromegaly present. Cardiovascular: Normal rate and regular rhythm. Pulmonary/Chest: Effort normal and breath sounds normal. No respiratory distress. He has no wheezes. He has no rales. Abdominal: He exhibits no distension. Lymphadenopathy:     He has no cervical adenopathy. Skin: No rash noted. Nursing note and vitals reviewed.     Results for orders placed or performed in visit on 12/04/17   AMB POC RAPID STREP A   Result Value Ref Range VALID INTERNAL CONTROL POC Yes     Group A Strep Ag Negative Negative       ASSESSMENT and PLAN    ICD-10-CM ICD-9-CM    1. Viral URI J06.9 465.9     B97.89     2. Sore throat J02.9 462 AMB POC RAPID STREP A   3.  Mild intermittent asthma without complication R71.64 807.10 albuterol (PROVENTIL HFA, VENTOLIN HFA, PROAIR HFA) 90 mcg/actuation inhaler         AVS instructions reviewed with patient, pt verbalized understanding

## 2018-02-27 ENCOUNTER — OFFICE VISIT (OUTPATIENT)
Dept: FAMILY MEDICINE CLINIC | Age: 43
End: 2018-02-27

## 2018-02-27 VITALS
HEART RATE: 90 BPM | OXYGEN SATURATION: 96 % | BODY MASS INDEX: 24.43 KG/M2 | RESPIRATION RATE: 16 BRPM | SYSTOLIC BLOOD PRESSURE: 114 MMHG | WEIGHT: 152 LBS | HEIGHT: 66 IN | DIASTOLIC BLOOD PRESSURE: 66 MMHG | TEMPERATURE: 97.3 F

## 2018-02-27 DIAGNOSIS — J34.89 NASAL SORE: Primary | ICD-10-CM

## 2018-02-27 DIAGNOSIS — R11.0 CHRONIC NAUSEA: ICD-10-CM

## 2018-02-27 DIAGNOSIS — L73.9 FOLLICULITIS: ICD-10-CM

## 2018-02-27 RX ORDER — MUPIROCIN 20 MG/G
OINTMENT TOPICAL
Qty: 30 G | Refills: 3 | Status: SHIPPED | OUTPATIENT
Start: 2018-02-27 | End: 2018-07-03

## 2018-02-27 RX ORDER — ONDANSETRON 8 MG/1
8 TABLET, ORALLY DISINTEGRATING ORAL
Qty: 30 TAB | Refills: 2 | Status: SHIPPED | OUTPATIENT
Start: 2018-02-27 | End: 2018-07-03

## 2018-02-27 RX ORDER — CEPHALEXIN 500 MG/1
500 CAPSULE ORAL 3 TIMES DAILY
Qty: 30 CAP | Refills: 0 | Status: SHIPPED | OUTPATIENT
Start: 2018-02-27 | End: 2018-03-09

## 2018-02-27 NOTE — PROGRESS NOTES
Rm 2  Pt presents to the clinic complaining of a spider bite on his abdomin. Pt also complains of cold symptoms including sinus pressure and pain. Flu Shot Requested: no    Depression Screening:  PHQ over the last two weeks 2/27/2018 12/4/2017 10/17/2017 9/18/2017 6/26/2017 4/26/2017 10/11/2016   Little interest or pleasure in doing things Not at all Not at all Not at all Not at all Not at all Not at all Not at all   Feeling down, depressed or hopeless Not at all Not at all Not at all Not at all Not at all Not at all Not at all   Total Score PHQ 2 0 0 0 0 0 0 0       Learning Assessment:  Learning Assessment 8/10/2015   PRIMARY LEARNER Patient   HIGHEST LEVEL OF EDUCATION - PRIMARY LEARNER  SOME COLLEGE   BARRIERS PRIMARY LEARNER NONE   CO-LEARNER CAREGIVER No   PRIMARY LANGUAGE ENGLISH   LEARNER PREFERENCE PRIMARY DEMONSTRATION   ANSWERED BY Patient   RELATIONSHIP SELF       Abuse Screening:  No flowsheet data found. Health Maintenance reviewed and discussed per provider: yes     Coordination of Care:    1. Have you been to the ER, urgent care clinic since your last visit? Hospitalized since your last visit? no    2. Have you seen or consulted any other health care providers outside of the [address] [place] [address] since your last visit? Include any pap smears or colon screening.  no

## 2018-02-27 NOTE — PATIENT INSTRUCTIONS
Take the antibiotic for 10 days and use the antibiotic nasal ointment for 5-7 days. At the end of treatment, if the nose isn't normal I will refer you to ENT. For the nausea, take Zofran every night before bedtime, see if this helps. We  Will call you with the lab results tomorrow.

## 2018-02-27 NOTE — PROGRESS NOTES
HISTORY OF PRESENT ILLNESS  James Melton is a 37 y.o. male. HPI Comments: Mr. Smith Crane is here with a couple of things going on. He's had some kind of sore in his nose for the past three days. He hasn't snorted anything, no sinus drainage, although the L maxillary sinus is tender. He also has a red spot on his abdomen that is tender and he thinks it's a spider bite. Finally, he waks up with nausea everyday. He has a history of heroin use for 20 years, now seeing his addiction specialist and getting methadone. The methadone doses have had to be increased and now he has nausea. He was having constipation but now smokes a couple of marijuana joints at night and it helps the constipation and helps him sleep    Insect Bite   Pertinent negatives include no chest pain, no abdominal pain, no headaches and no shortness of breath. Review of Systems   Constitutional: Negative for chills and fever. HENT: Negative for sore throat. Eyes: Negative for blurred vision and double vision. Respiratory: Negative for cough and shortness of breath. Cardiovascular: Negative for chest pain and palpitations. Gastrointestinal: Positive for nausea. Negative for abdominal pain and vomiting. Musculoskeletal: Negative for myalgias and neck pain. Skin:        Nasal sore, abdomen sore   Neurological: Negative for headaches. Physical Exam   Constitutional: He is oriented to person, place, and time. He appears well-developed and well-nourished. HENT:   Whitish sore on the inside of his L nose. Eyes: Pupils are equal, round, and reactive to light. Neck: Neck supple. Cardiovascular: Normal rate and regular rhythm. Pulmonary/Chest: Effort normal and breath sounds normal. No respiratory distress. He has no wheezes. He has no rales. Abdominal: Soft. He exhibits no distension. There is no tenderness. There is no rebound. Lymphadenopathy:     He has no cervical adenopathy.    Neurological: He is alert and oriented to person, place, and time. Skin:   Tiny red cystic feeling lesion lower abdomen   Nursing note and vitals reviewed. ASSESSMENT and PLAN    ICD-10-CM ICD-9-CM    1. Nasal sore J34.89 478.19 cephALEXin (KEFLEX) 500 mg capsule      mupirocin (BACTROBAN) 2 % ointment   2. Chronic nausea V29.9 923.22 METABOLIC PANEL, COMPREHENSIVE      ondansetron (ZOFRAN ODT) 8 mg disintegrating tablet      METABOLIC PANEL, COMPREHENSIVE   3.  Folliculitis C47.1 291.0 cephALEXin (KEFLEX) 500 mg capsule       AVS instructions reviewed with patient, pt verbalized understanding

## 2018-02-27 NOTE — MR AVS SNAPSHOT
Willie Misael 
 
 
 [facility] [address] [facility] [address] [address] 
[phone] Patient: Rishabh Marino MRN: [id number] TWQ:2/04/4959 Visit Information Date & Time Provider Department Dept. Phone Encounter #  
 2/27/2018  1:15 PM Ruba Pittman, [address] [facility] [phone] [id number] Follow-up Instructions Return in about 3 months (around 5/27/2018), or if symptoms worsen or fail to improve. Upcoming Health Maintenance Date Due Pneumococcal 19-64 Medium Risk (1 of 1 - PPSV23) 2/13/1992 DTaP/Tdap/Td series (1 - Tdap) 2/13/1994 Influenza Age 5 to Adult 8/1/2017 Allergies as of 2/27/2018  Review Complete On: 2/27/2018 By: Ruba Pittman MD  
 No Known Allergies Current Immunizations  Never Reviewed No immunizations on file. Not reviewed this visit You Were Diagnosed With   
  
 Codes Comments Nasal sore    -  Primary ICD-10-CM: D21.71 ICD-9-CM: 478.19 Chronic nausea     ICD-10-CM: R11.0 ICD-9-CM: 787.02 Folliculitis     TJS-00-LR: L73.9 ICD-9-CM: 704.8 Vitals BP Pulse Temp Resp Height(growth percentile) Weight(growth percentile) 114/66 (BP 1 Location: Left arm, BP Patient Position: Sitting) 90 97.3 °F (36.3 °C) (Oral) 16 5' 6\" (1.676 m) 152 lb (68.9 kg) SpO2 BMI Smoking Status 96% 24.53 kg/m2 Never Smoker Vitals History BMI and BSA Data Body Mass Index Body Surface Area 24.53 kg/m 2 1.79 m 2 Preferred Pharmacy Pharmacy Name Phone [facility] [facility], [address] [place] [address] [phone] Your Updated Medication List  
  
   
This list is accurate as of 2/27/18  1:44 PM.  Always use your most recent med list.  
  
  
  
  
 albuterol 90 mcg/actuation inhaler Commonly known as:  PROVENTIL HFA, VENTOLIN HFA, PROAIR HFA Take 2 Puffs by inhalation every six (6) hours as needed for Wheezing. cephALEXin 500 mg capsule Commonly known as:  Ish Montrose Take 1 Cap by mouth three (3) times daily for 10 days. methadone 10 mg tablet Commonly known as:  DOLOPHINE Take 70 mg by mouth every four (4) hours as needed for Pain. mupirocin 2 % ointment Commonly known as:  [facility] Apply to nasal sore twice daily  
  
 ondansetron 8 mg disintegrating tablet Commonly known as:  ZOFRAN ODT Take 1 Tab by mouth nightly as needed for Nausea. Prescriptions Sent to Pharmacy Refills  
 cephALEXin (KEFLEX) 500 mg capsule 0 Sig: Take 1 Cap by mouth three (3) times daily for 10 days. Class: Normal  
 Pharmacy: [facility] [address] [place] [address] Ph #: [phone] Route: Oral  
 mupirocin (BACTROBAN) 2 % ointment 3 Sig: Apply to nasal sore twice daily Class: Normal  
 Pharmacy: [facility] [address] [place] [address] Ph #: [phone]  
 ondansetron (ZOFRAN ODT) 8 mg disintegrating tablet 2 Sig: Take 1 Tab by mouth nightly as needed for Nausea. Class: Normal  
 Pharmacy: [facility] [address] [place] [address] Ph #: [phone] Route: Oral  
  
Follow-up Instructions Return in about 3 months (around 5/27/2018), or if symptoms worsen or fail to improve. To-Do List   
 02/27/2018 Lab:  METABOLIC PANEL, COMPREHENSIVE Patient Instructions Take the antibiotic for 10 days and use the antibiotic nasal ointment for 5-7 days. At the end of treatment, if the nose isn't normal I will refer you to ENT. For the nausea, take Zofran every night before bedtime, see if this helps. We  Will call you with the lab results tomorrow. Introducing [facility]!    
 [employer] introduces [employer] patient portal. Now you can access parts of your medical record, email your doctor's office, and request medication refills online. 1. In your internet browser, go to [email or web address] 2. Click on the First Time User? Click Here link in the Sign In box. You will see the New Member Sign Up page. 3. Enter your [employer] Access Code exactly as it appears below. You will not need to use this code after youve completed the sign-up process. If you do not sign up before the expiration date, you must request a new code. · [employer] Access Code: [email or web address] Expires: 5/28/2018  1:44 PM 
 
4. Enter the last four digits of your Social Security Number (xxxx) and Date of Birth (mm/dd/yyyy) as indicated and click Submit. You will be taken to the next sign-up page. 5. Create a [employer] ID. This will be your [employer] login ID and cannot be changed, so think of one that is secure and easy to remember. 6. Create a [employer] password. You can change your password at any time. 7. Enter your Password Reset Question and Answer. This can be used at a later time if you forget your password. 8. Enter your e-mail address. You will receive e-mail notification when new information is available in [address] 9. Click Sign Up. You can now view and download portions of your medical record. 10. Click the Download Summary menu link to download a portable copy of your medical information. If you have questions, please visit the Frequently Asked Questions section of the [employer] website. Remember, [employer] is NOT to be used for urgent needs. For medical emergencies, dial 911. Now available from your iPhone and Android! Please provide this summary of care documentation to your next provider. Your primary care clinician is listed as Steven Acosta. If you have any questions after today's visit, please call [phone].

## 2018-02-28 LAB
A-G RATIO,AGRAT: 1.5 RATIO (ref 1.1–2.6)
ALBUMIN SERPL-MCNC: 4.3 G/DL (ref 3.5–5)
ALP SERPL-CCNC: 109 U/L (ref 25–115)
ALT SERPL-CCNC: 18 U/L (ref 5–40)
ANION GAP SERPL CALC-SCNC: 16 MMOL/L
AST SERPL W P-5'-P-CCNC: 20 U/L (ref 10–37)
BILIRUB SERPL-MCNC: 0.4 MG/DL (ref 0.2–1.2)
BUN SERPL-MCNC: 11 MG/DL (ref 6–22)
CALCIUM SERPL-MCNC: 9.7 MG/DL (ref 8.4–10.4)
CHLORIDE SERPL-SCNC: 97 MMOL/L (ref 98–110)
CO2 SERPL-SCNC: 30 MMOL/L (ref 20–32)
CREAT SERPL-MCNC: 0.9 MG/DL (ref 0.5–1.2)
GFRAA, 66117: >60
GFRNA, 66118: >60
GLOBULIN,GLOB: 2.9 G/DL (ref 2–4)
GLUCOSE SERPL-MCNC: 108 MG/DL (ref 70–99)
POTASSIUM SERPL-SCNC: 4.5 MMOL/L (ref 3.5–5.5)
PROT SERPL-MCNC: 7.2 G/DL (ref 6.4–8.3)
SODIUM SERPL-SCNC: 143 MMOL/L (ref 133–145)

## 2018-07-03 ENCOUNTER — OFFICE VISIT (OUTPATIENT)
Dept: FAMILY MEDICINE CLINIC | Age: 43
End: 2018-07-03

## 2018-07-03 VITALS
HEART RATE: 75 BPM | DIASTOLIC BLOOD PRESSURE: 76 MMHG | WEIGHT: 148 LBS | OXYGEN SATURATION: 94 % | SYSTOLIC BLOOD PRESSURE: 114 MMHG | HEIGHT: 66 IN | BODY MASS INDEX: 23.78 KG/M2 | TEMPERATURE: 98.5 F | RESPIRATION RATE: 18 BRPM

## 2018-07-03 DIAGNOSIS — R11.2 NAUSEA AND VOMITING, INTRACTABILITY OF VOMITING NOT SPECIFIED, UNSPECIFIED VOMITING TYPE: Primary | ICD-10-CM

## 2018-07-03 RX ORDER — ONDANSETRON 8 MG/1
8 TABLET, ORALLY DISINTEGRATING ORAL
Qty: 12 TAB | Refills: 3 | Status: SHIPPED | OUTPATIENT
Start: 2018-07-03

## 2018-07-03 NOTE — MR AVS SNAPSHOT
[address] [place] [id number] 
[phone] Patient: Jag Martins MRN: [id number] XYT:5/43/3718 Visit Information Date & Time Provider Department Dept. Phone Encounter #  
 7/3/2018  2:15 PM Rena Brown, [address] [phone] [id number] Follow-up Instructions Return if symptoms worsen or fail to improve. Upcoming Health Maintenance Date Due Pneumococcal 19-64 Medium Risk (1 of 1 - PPSV23) 2/13/1992 DTaP/Tdap/Td series (1 - Tdap) 2/13/1994 Influenza Age 5 to Adult 8/1/2018 Allergies as of 7/3/2018  Review Complete On: 7/3/2018 By: Rena Brown MD  
 No Known Allergies Current Immunizations  Never Reviewed No immunizations on file. Not reviewed this visit You Were Diagnosed With   
  
 Codes Comments Nausea and vomiting, intractability of vomiting not specified, unspecified vomiting type    -  Primary ICD-10-CM: R11.2 ICD-9-CM: 787.01 Vitals BP Pulse Temp Resp Height(growth percentile) Weight(growth percentile) 114/76 (BP 1 Location: Right arm, BP Patient Position: Sitting) 75 98.5 °F (36.9 °C) (Oral) 18 5' 6\" (1.676 m) 148 lb (67.1 kg) SpO2 BMI Smoking Status 94% 23.89 kg/m2 Never Smoker Vitals History BMI and BSA Data Body Mass Index Body Surface Area  
 23.89 kg/m 2 1.77 m 2 Preferred Pharmacy Pharmacy Name Phone [facility] [address] [phone] Your Updated Medication List  
  
   
This list is accurate as of 7/3/18  2:53 PM.  Always use your most recent med list.  
  
  
  
  
 albuterol 90 mcg/actuation inhaler Commonly known as:  PROVENTIL HFA, VENTOLIN HFA, PROAIR HFA Take 2 Puffs by inhalation every six (6) hours as needed for Wheezing. methadone 10 mg tablet Commonly known as:  DOLOPHINE  
 Take 70 mg by mouth every four (4) hours as needed for Pain. mupirocin 2 % ointment Commonly known as:  [facility] Apply to nasal sore twice daily  
  
 ondansetron 8 mg disintegrating tablet Commonly known as:  ZOFRAN ODT Take 1 Tab by mouth every eight (8) hours as needed for Nausea. Prescriptions Sent to Pharmacy Refills  
 ondansetron (ZOFRAN ODT) 8 mg disintegrating tablet 3 Sig: Take 1 Tab by mouth every eight (8) hours as needed for Nausea. Class: Normal  
 Pharmacy: [facility] [address] [place] [address] #: [phone] Route: Oral  
  
Follow-up Instructions Return if symptoms worsen or fail to improve. Patient Instructions Take the Zofran as prescribed, as needed. Recheck as needed Introducing [facility]! Elizabeth Barrera introduces [employer] patient portal. Now you can access parts of your medical record, email your doctor's office, and request medication refills online. 1. In your internet browser, go to [email or web address] 2. Click on the First Time User? Click Here link in the Sign In box. You will see the New Member Sign Up page. 3. Enter your [employer] Access Code exactly as it appears below. You will not need to use this code after youve completed the sign-up process. If you do not sign up before the expiration date, you must request a new code. · [employer] Access Code: [email or web address] Expires: 10/1/2018  2:53 PM 
 
4. Enter the last four digits of your Social Security Number (xxxx) and Date of Birth (mm/dd/yyyy) as indicated and click Submit. You will be taken to the next sign-up page. 5. Create a [employer] ID. This will be your [employer] login ID and cannot be changed, so think of one that is secure and easy to remember. 6. Create a [employer] password. You can change your password at any time. 7. Enter your Password Reset Question and Answer. This can be used at a later time if you forget your password. 8. Enter your e-mail address. You will receive e-mail notification when new information is available in [address] 9. Click Sign Up. You can now view and download portions of your medical record. 10. Click the Download Summary menu link to download a portable copy of your medical information. If you have questions, please visit the Frequently Asked Questions section of the [employer] website. Remember, [employer] is NOT to be used for urgent needs. For medical emergencies, dial 911. Now available from your iPhone and Android! Please provide this summary of care documentation to your next provider. Your primary care clinician is listed as Steven Acosta. If you have any questions after today's visit, please call [phone].

## 2018-07-03 NOTE — PROGRESS NOTES
HISTORY OF PRESENT ILLNESS  Shaista Julian is a 37 y.o. male. HPI Comments: Mr. Xochitl Carter is a 38 yo male on methadone, who presents with fatigue and nausea since yesterday and 1 episode of \"violent\" vomiting ~0400 this AM within five minutes of consuming chocolate milk. He attributes his symptoms to side effects from methadone. He states that he was previously prescribed an anti-nausea medication, which helped in the past. He denies any hematemesis, coffee-ground emesis, abd pain, blood in stool, diarrhea, constipation, fever or chills. Epigastric Pain    Associated symptoms include nausea and vomiting. Pertinent negatives include no fever, no frequency, no headaches and no chest pain. Review of Systems   Constitutional: Positive for malaise/fatigue. Negative for chills and fever. HENT: Negative for congestion, ear pain and sore throat. Eyes: Negative for discharge and redness. Respiratory: Negative for cough and shortness of breath. Cardiovascular: Negative for chest pain, palpitations and orthopnea. Gastrointestinal: Positive for nausea and vomiting. Negative for abdominal pain. Genitourinary: Negative for frequency and urgency. Skin: Negative for rash. Neurological: Negative for weakness and headaches. Endo/Heme/Allergies: Does not bruise/bleed easily. Physical Exam   Constitutional: He appears well-developed and well-nourished. HENT:   Right Ear: Tympanic membrane, external ear and ear canal normal.   Left Ear: Tympanic membrane, external ear and ear canal normal.   Nose: Nose normal.   Mouth/Throat: Uvula is midline, oropharynx is clear and moist and mucous membranes are normal. No posterior oropharyngeal erythema. Eyes: Conjunctivae are normal. Pupils are equal, round, and reactive to light. Right conjunctiva is not injected. Left conjunctiva is not injected. Neck: Neck supple. No thyromegaly present. Cardiovascular: Normal rate and regular rhythm. Pulmonary/Chest: Effort normal and breath sounds normal. No respiratory distress. He has no wheezes. He has no rales. Abdominal: He exhibits no distension. There is no tenderness. There is no rebound. Lymphadenopathy:     He has no cervical adenopathy. Skin: No rash noted. Nursing note and vitals reviewed.       ASSESSMENT and PLAN    ICD-10-CM ICD-9-CM    1. Nausea and vomiting, intractability of vomiting not specified, unspecified vomiting type R11.2 787.01

## 2018-07-03 NOTE — PROGRESS NOTES
Rm:1    Chief Complaint   Patient presents with    Epigastric Pain     vomiting, nausea, stomach cramping     Depression Screening:  PHQ over the last two weeks 7/3/2018 2/27/2018 12/4/2017 10/17/2017 9/18/2017 6/26/2017 4/26/2017   Little interest or pleasure in doing things Not at all Not at all Not at all Not at all Not at all Not at all Not at all   Feeling down, depressed or hopeless Not at all Not at all Not at all Not at all Not at all Not at all Not at all   Total Score PHQ 2 0 0 0 0 0 0 0       Learning Assessment:  Learning Assessment 8/10/2015   PRIMARY LEARNER Patient   HIGHEST LEVEL OF EDUCATION - PRIMARY LEARNER  SOME COLLEGE   BARRIERS PRIMARY LEARNER NONE   CO-LEARNER CAREGIVER No   PRIMARY LANGUAGE ENGLISH   LEARNER PREFERENCE PRIMARY DEMONSTRATION   ANSWERED BY Patient   RELATIONSHIP SELF       Abuse Screening:  No flowsheet data found. Health Maintenance reviewed and discussed per provider: yes     Coordination of Care:    1. Have you been to the ER, urgent care clinic since your last visit? Hospitalized since your last visit? no    2. Have you seen or consulted any other health care providers outside of the [address] [place] [address] since your last visit? Include any pap smears or colon screening.  no